# Patient Record
Sex: MALE
[De-identification: names, ages, dates, MRNs, and addresses within clinical notes are randomized per-mention and may not be internally consistent; named-entity substitution may affect disease eponyms.]

---

## 2022-02-16 PROBLEM — Z00.00 ENCOUNTER FOR PREVENTIVE HEALTH EXAMINATION: Status: ACTIVE | Noted: 2022-02-16

## 2022-02-18 ENCOUNTER — APPOINTMENT (OUTPATIENT)
Dept: UROLOGY | Facility: CLINIC | Age: 77
End: 2022-02-18
Payer: MEDICARE

## 2022-02-18 DIAGNOSIS — Z80.3 FAMILY HISTORY OF MALIGNANT NEOPLASM OF BREAST: ICD-10-CM

## 2022-02-18 DIAGNOSIS — Z78.9 OTHER SPECIFIED HEALTH STATUS: ICD-10-CM

## 2022-02-18 DIAGNOSIS — Z80.52 FAMILY HISTORY OF MALIGNANT NEOPLASM OF BLADDER: ICD-10-CM

## 2022-02-18 DIAGNOSIS — N40.1 BENIGN PROSTATIC HYPERPLASIA WITH LOWER URINARY TRACT SYMPMS: ICD-10-CM

## 2022-02-18 DIAGNOSIS — Z83.3 FAMILY HISTORY OF DIABETES MELLITUS: ICD-10-CM

## 2022-02-18 DIAGNOSIS — N13.8 BENIGN PROSTATIC HYPERPLASIA WITH LOWER URINARY TRACT SYMPMS: ICD-10-CM

## 2022-02-18 DIAGNOSIS — Z80.42 FAMILY HISTORY OF MALIGNANT NEOPLASM OF PROSTATE: ICD-10-CM

## 2022-02-18 PROCEDURE — 99204 OFFICE O/P NEW MOD 45 MIN: CPT

## 2022-02-18 RX ORDER — COLD-HOT PACK
EACH MISCELLANEOUS
Refills: 0 | Status: ACTIVE | COMMUNITY

## 2022-02-18 RX ORDER — ASCORBIC ACID 500 MG
TABLET ORAL
Refills: 0 | Status: ACTIVE | COMMUNITY

## 2022-02-18 RX ORDER — CHROMIUM 200 MCG
TABLET ORAL
Refills: 0 | Status: ACTIVE | COMMUNITY

## 2022-02-18 NOTE — HISTORY OF PRESENT ILLNESS
[Home] : at home, [unfilled] , at the time of the visit. [Medical Office: ([facility])___] : at the medical office located in  [Verbal consent obtained from patient] : the patient, [unfilled] [FreeTextEntry1] : Dear SAM Garcia (PCP)\par Dear Bryon Canales (PA at [facility])\par \par Thank you so much for the referral to help care for your patient.\par \par Chief Complaint Elevated PSA\par Date of first visit: 02/18/2022\par Occupation: Retired\par \par VIRAJ ROCHA is a 76 year old  man with no PMHx who presents for elevated PSA. His PSA has been trending up over the past year, most recently 3.7 ng/ml. MRI on 2/10/22 demonstrated no focal suspicious lesions however noted several nodules with early enhancement post contrast in the TZ (PIRADS 2). His PSA density is normal (0.07 ng/ml/cc). He has never had a prostate biopsy. He does have a family hx of prostate cancer in his father who was diagnosed with Carolyne 8 cancer. He is unsure at what age he was diagnosed but he did die of metastatic disease at age 89. Father also with hx of bladder cancer diagnosed age 62 and mother with breast cancer.\par \par He feels well overall today but very anxious about potential diagnosis. Has minimal LUTS, no bother. We readjusted his IPSS form after discussion. Nocturia x1-2 vs x5. Denies dysuria, hematuria.\par \par PSA Hx: \par 3.7 11/13/21. PSAD 0.07 ng/ml/cc\par 2.4 8/23/21\par 1.8 8/28/20\par \par MRI at [facility] on 02/10/2022. 5.2 x 3.6 x 4.1 cm; volume 48 cc prostate with PIRADS 2. Evaluation of the peripheral zone demonstrates indistinct linear and wedge shape hypointense signal on diffusion weighted imaging and T2 weighted imaging without discrete focal lesion. No LAD No EPE, No Bony Lesions. \par \par 02/18/2022 \par IPSS 5 QOL 2\par \par Prostate cancer screening: the patient and I spoke at length about prostate cancer screening, its risks and its benefits. The patient has 3 (age, PSA, FH) risk factors for prostate cancer.  He understands that many men with prostate cancer will die with the disease rather than of it and we also discussed the results large multi-center American and  prostate cancer screening trials. He also understands that PSA in and of itself does not diagnose prostate cancer but only assesses risk to a certain degree. The patient understands that to definitively screen for prostate cancer, a biopsy is required and this procedure has risks, including bleeding, infection, ED and urinary retention. The patient opted to repeat MRI and book for potential fusion biopsy.\par \par The patient denies fevers, chills, nausea and or vomiting and no unexplained weight loss.\par \par All pertinent laboratory, films and physician notes were reviewed.  Questionnaire results were discussed with patient.

## 2022-02-18 NOTE — PHYSICAL EXAM
[General Appearance - Well Developed] : well developed [General Appearance - Well Nourished] : well nourished [Normal Appearance] : normal appearance [Well Groomed] : well groomed [General Appearance - In No Acute Distress] : no acute distress [] : no respiratory distress [Respiration, Rhythm And Depth] : normal respiratory rhythm and effort [Exaggerated Use Of Accessory Muscles For Inspiration] : no accessory muscle use [Oriented To Time, Place, And Person] : oriented to person, place, and time [Affect] : the affect was normal [Mood] : the mood was normal

## 2022-02-18 NOTE — ASSESSMENT
[FreeTextEntry1] : 75 yo male with elevated PSA 3.7 ng/ml, negative MRI 2/10/22, normal PSAD 0.07 ng/ml/cc, no prior biopsy, + family hx bladder and high grade prostate cancer in father. \par \par 1. BPH- offered medication. He is happy not on medical therapy.\par 2. Repeat MRI at [facility]- discussed importance of Fleet enema\par 3. Book for biopsy\par 4. Bring disc of current MRI for comparison\par \par Follow up after MRI/before biopsy for physical exam and image review\par \par IRB Notification\par \par The patient has been made aware of the opportunity to participate in our MR US fusion guided biopsy trial testing the next generation biopsy technology.  This is an IRB approved trial (IRB #).  He is already being consented for a standard MR US fusion guided biopsy therefore there is no change in his clinical work flow.  He has been given a copy of the consent to review before the biopsy date and given an opportunity to contact us with any further questions.  He will be consented on the day of the procedure or electronically before the biopsy.\par \par He understands that many men with prostate cancer will die with the disease rather than of it and we also discussed the results large multi-center American and  prostate cancer screening trials. He also understands that PSA in and of itself does not diagnose prostate cancer but only assesses risk to a certain degree. The patient understands that to definitively screen for prostate cancer, a biopsy is required and this procedure has risks, including bleeding, infection, ED and urinary retention. The patient opted to move forward with the biopsy.\par \par The patient is aware to expect hematuria x 2 weeks and upto 4 weeks of hematospermia.  There is a risk of infection albeit much lower than a transrectal approach. In some cases patients can experience erectile dysfunction but this is usually self limiting.  Any fever/chills after the biopsy the patient is to contact the office and go to the ER for an immediate evaluation. He has been given paper instructions outlining these items - which includes medications to avoid prior to surgery.\par \par 1. CBC, BMP, PSA, Covid Test, UA UCx EKG\par 2. Medical Clearance\par 3. TP biopsy at [facility]\par 4. follow up 2 weeks after biopsy with his primary urologist or ourselves.\par 5. we will call with the path results once they are resulted.\par \par Dr Humphreys was in the office and available for consultation during the entirety of this visit.

## 2022-02-22 ENCOUNTER — TRANSCRIPTION ENCOUNTER (OUTPATIENT)
Age: 77
End: 2022-02-22

## 2022-03-02 ENCOUNTER — NON-APPOINTMENT (OUTPATIENT)
Age: 77
End: 2022-03-02

## 2022-03-07 ENCOUNTER — APPOINTMENT (OUTPATIENT)
Dept: UROLOGY | Facility: CLINIC | Age: 77
End: 2022-03-07
Payer: MEDICARE

## 2022-03-09 ENCOUNTER — APPOINTMENT (OUTPATIENT)
Dept: UROLOGY | Facility: CLINIC | Age: 77
End: 2022-03-09

## 2022-03-10 ENCOUNTER — APPOINTMENT (OUTPATIENT)
Dept: UROLOGY | Facility: AMBULATORY SURGERY CENTER | Age: 77
End: 2022-03-10

## 2022-03-21 ENCOUNTER — APPOINTMENT (OUTPATIENT)
Dept: UROLOGY | Facility: CLINIC | Age: 77
End: 2022-03-21
Payer: MEDICARE

## 2022-03-21 VITALS
WEIGHT: 130 LBS | TEMPERATURE: 97.9 F | HEIGHT: 68 IN | DIASTOLIC BLOOD PRESSURE: 76 MMHG | HEART RATE: 73 BPM | BODY MASS INDEX: 19.7 KG/M2 | SYSTOLIC BLOOD PRESSURE: 125 MMHG

## 2022-03-21 DIAGNOSIS — R97.20 ELEVATED PROSTATE, SPECIFIC ANTIGEN [PSA]: ICD-10-CM

## 2022-03-21 PROCEDURE — 99214 OFFICE O/P EST MOD 30 MIN: CPT

## 2022-03-21 NOTE — PHYSICAL EXAM
[Abdomen Soft] : soft [Urethral Meatus] : meatus normal [Penis Abnormality] : normal circumcised penis [Testes Tenderness] : no tenderness of the testes [Testes Mass (___cm)] : there were no testicular masses [Prostate Tenderness] : the prostate was not tender [No Prostate Nodules] : no prostate nodules [] : no rash [No Focal Deficits] : no focal deficits

## 2022-03-22 LAB
APPEARANCE: CLEAR
BACTERIA: NEGATIVE
BILIRUBIN URINE: NEGATIVE
BLOOD URINE: NEGATIVE
COLOR: NORMAL
GLUCOSE QUALITATIVE U: NEGATIVE
HYALINE CASTS: 1 /LPF
KETONES URINE: NEGATIVE
LEUKOCYTE ESTERASE URINE: NEGATIVE
MICROSCOPIC-UA: NORMAL
NITRITE URINE: NEGATIVE
PH URINE: 6
PROTEIN URINE: NORMAL
RED BLOOD CELLS URINE: 2 /HPF
SPECIFIC GRAVITY URINE: 1.01
SQUAMOUS EPITHELIAL CELLS: 0 /HPF
UROBILINOGEN URINE: NORMAL
WHITE BLOOD CELLS URINE: 1 /HPF

## 2022-03-23 LAB — BACTERIA UR CULT: NORMAL

## 2022-03-29 ENCOUNTER — NON-APPOINTMENT (OUTPATIENT)
Age: 77
End: 2022-03-29

## 2022-03-29 NOTE — HISTORY OF PRESENT ILLNESS
[FreeTextEntry1] : Dear SAM Garcia (PCP)\par Dear Bryon Canales (PA at [facility])\par \par Thank you so much for the referral to help care for your patient.\par \par Chief Complaint Elevated PSA\par Date of first visit: 02/18/2022\par Occupation: Retired\par \par VIRAJ ROCHA is a 76 year old  man with no PMHx who presents for elevated PSA. His PSA has been trending up over the past year, most recently 3.7 ng/ml. MRI on 3/1/22 demonstrated no suspicious lesions. His PSA density is normal (0.11 ng/ml/cc). He has never had a prostate biopsy. He does have a family hx of prostate cancer in his father who was diagnosed with Padroni 8 cancer. He is unsure at what age he was diagnosed but he did die of metastatic disease at age 89. Father also with hx of bladder cancer diagnosed age 62 and mother with breast cancer.\par \par He feels well overall today but very anxious about potential diagnosis. Has minimal LUTS, no bother. We readjusted his IPSS form after discussion. Nocturia x1-2 vs x5. Denies dysuria, hematuria.\par \par PSA Hx: \par 2.1 2/25/22 \par 3.7 11/13/21. PSAD 0.11 ng/ml/cc\par 2.4 8/23/21\par 1.8 8/28/20\par \par MRI Hx\par MRI at [facility] on 3/1/2022. 31 cc prostate with no suspicious prostate lesion, PIRADS 2.  No LAD No EPE, No Bony Lesions.  The images have been reviewed and clinical implications discussed with the patient.\par \par MRI at [facility] on 02/10/2022. 5.2 x 3.6 x 4.1 cm; volume 48 cc prostate with PIRADS 2. Evaluation of the peripheral zone demonstrates indistinct linear and wedge shape hypointense signal on diffusion weighted imaging and T2 weighted imaging without discrete focal lesion. No LAD No EPE, No Bony Lesions. \par \par 03/21/2022\par IPSS   QOL\par SERGIO\par \par 02/18/2022 \par IPSS 5 QOL 2\par \par Prostate cancer screening: the patient and I spoke at length about prostate cancer screening, its risks and its benefits. The patient has 3 (age, PSA, FH) risk factors for prostate cancer.  He understands that many men with prostate cancer will die with the disease rather than of it and we also discussed the results large multi-center American and  prostate cancer screening trials. He also understands that PSA in and of itself does not diagnose prostate cancer but only assesses risk to a certain degree. The patient understands that to definitively screen for prostate cancer, a biopsy is required and this procedure has risks, including bleeding, infection, ED and urinary retention. The patient opted to repeat MRI and book for potential fusion biopsy.  \par \par The patient denies fevers, chills, nausea and or vomiting and no unexplained weight loss.\par \par All pertinent laboratory, films and physician notes were reviewed.  Questionnaire results were discussed with patient.\par \par I spent 31 minutes of non-face to face time reviewing the patient's records, chart, uploading processing MR images, transferring MR images from PACS to an independent workstation, reviewing images on independent workstation, speaking with their physician and planning their prostate biopsy and preparation of an upcoming visit for 03/21/2022 .  The imaging and planning was highly complex and took this entire time. \par \par We decided to do a select Mdx to assess risk and hopefully avoid a biopsy.\par

## 2022-03-29 NOTE — ASSESSMENT
[FreeTextEntry1] : 77 yo male with elevated PSA 3.7 ng/ml, negative MRI 2/10/22, normal PSAD 0.07 ng/ml/cc, no prior biopsy, + family hx bladder and high grade prostate cancer in father. \par \par The prostate MRI has been reviewed with the patient (images and report).  There are no suspicious lesions on 2nd look.  Reviewing multiple studies the probability of having prostate cancer with a negative prostate MRI is ~ 20%.  However, the probability of having clinically significant disease is <5% of those positive.  A recent study from [facility] presented level 1b evidence that a MRI can help avoid patients biopsies and only misses ~ 3% of clinically significant disease.  \par \par I have discussed these details at length with the patient.  He is aware of the pro's and con's of prostate cancer screening.  Taking into account his PSA, Family History and HERMELINDA findings.  He wishes at this time to avoid a biopsy and will continue to undergo PSA based screening. If the PSA continues to increase or there is increasing clinical suspicion we will repeat MR imaging of the prostate prior to any intervention, due to risks associated with the prostate biopsy. The patient understands that a prostate biopsy is still the standard of care with regards to screening and MRI is an adjunct that can help localize and target more suspicious areas.  In conclusion, he would like to hold off on the biopsy at this time.\par \par 1. BPH- offered medication. He is happy not on medical therapy.\par 2. MRI no lesions but atypical BPH nodule not called on report - i would not use this as a reason for biopsy given PSA dropped back down to 2.1\par    - select MDx to assess risk.  10-30% discuss biopsy.  (resulted 3/29/22- 17% risk of detecting HG disease. he is aware. he will speak with primary urologist and let us know)\par 3. he will return to chat about findings.\par \par Thank you very much for allowing me to assist in the care of this patient. Should you have any additional questions or concerns please do not hesitate to contact me.\par \par \par Sincerely,\par \par \par Mio Humphreys D.O.\par  of Urology and Radiology\par  of Urology at [facility]\par System Director for Prostate Cancer\par [address], [address] [facility], [address]\par Phone: [phone]\par

## 2022-04-12 ENCOUNTER — NON-APPOINTMENT (OUTPATIENT)
Age: 77
End: 2022-04-12

## 2022-05-20 ENCOUNTER — NON-APPOINTMENT (OUTPATIENT)
Age: 77
End: 2022-05-20

## 2024-08-29 ENCOUNTER — APPOINTMENT (OUTPATIENT)
Dept: PHYSICAL MEDICINE AND REHAB | Facility: CLINIC | Age: 79
End: 2024-08-29

## 2024-10-07 ENCOUNTER — APPOINTMENT (OUTPATIENT)
Dept: PHYSICAL MEDICINE AND REHAB | Facility: CLINIC | Age: 79
End: 2024-10-07